# Patient Record
Sex: MALE | URBAN - METROPOLITAN AREA
[De-identification: names, ages, dates, MRNs, and addresses within clinical notes are randomized per-mention and may not be internally consistent; named-entity substitution may affect disease eponyms.]

---

## 2021-12-14 ENCOUNTER — APPOINTMENT (OUTPATIENT)
Dept: URBAN - METROPOLITAN AREA CLINIC 272 | Age: 41
Setting detail: DERMATOLOGY
End: 2021-12-16

## 2021-12-14 DIAGNOSIS — L21.8 OTHER SEBORRHEIC DERMATITIS: ICD-10-CM

## 2021-12-14 DIAGNOSIS — L72.0 EPIDERMAL CYST: ICD-10-CM

## 2021-12-14 PROCEDURE — OTHER COUNSELING: OTHER

## 2021-12-14 PROCEDURE — OTHER MIPS QUALITY: OTHER

## 2021-12-14 PROCEDURE — 99203 OFFICE O/P NEW LOW 30 MIN: CPT

## 2021-12-14 PROCEDURE — OTHER PRESCRIPTION: OTHER

## 2021-12-14 PROCEDURE — OTHER ADDITIONAL NOTES: OTHER

## 2021-12-14 RX ORDER — KETOCONAZOLE 20 MG/G
CREAM TOPICAL
Qty: 60 | Refills: 11 | Status: ERX | COMMUNITY
Start: 2021-12-14

## 2021-12-14 ASSESSMENT — LOCATION DETAILED DESCRIPTION DERM
LOCATION DETAILED: LEFT SUPERIOR MEDIAL MALAR CHEEK
LOCATION DETAILED: LEFT MEDIAL SUPERIOR EYELID
LOCATION DETAILED: LEFT MEDIAL MALAR CHEEK
LOCATION DETAILED: RIGHT MEDIAL MALAR CHEEK
LOCATION DETAILED: RIGHT SUPERIOR CENTRAL MALAR CHEEK

## 2021-12-14 ASSESSMENT — LOCATION SIMPLE DESCRIPTION DERM
LOCATION SIMPLE: LEFT SUPERIOR EYELID
LOCATION SIMPLE: RIGHT CHEEK
LOCATION SIMPLE: LEFT CHEEK

## 2021-12-14 ASSESSMENT — LOCATION ZONE DERM
LOCATION ZONE: FACE
LOCATION ZONE: EYELID

## 2021-12-14 NOTE — PROCEDURE: ADDITIONAL NOTES
Patient Management Risk Assessment: Low
Detail Level: Simple
Render Risk Assessment In Note?: yes
Additional Notes: 11 blade removal